# Patient Record
Sex: MALE | Race: WHITE | Employment: FULL TIME | URBAN - METROPOLITAN AREA
[De-identification: names, ages, dates, MRNs, and addresses within clinical notes are randomized per-mention and may not be internally consistent; named-entity substitution may affect disease eponyms.]

---

## 2022-10-07 ENCOUNTER — OFFICE VISIT (OUTPATIENT)
Dept: CARDIOLOGY CLINIC | Age: 62
End: 2022-10-07
Payer: COMMERCIAL

## 2022-10-07 ENCOUNTER — TELEPHONE (OUTPATIENT)
Dept: CARDIOLOGY CLINIC | Age: 62
End: 2022-10-07

## 2022-10-07 VITALS
DIASTOLIC BLOOD PRESSURE: 86 MMHG | HEART RATE: 72 BPM | BODY MASS INDEX: 29.8 KG/M2 | OXYGEN SATURATION: 95 % | SYSTOLIC BLOOD PRESSURE: 138 MMHG | HEIGHT: 72 IN | WEIGHT: 220 LBS

## 2022-10-07 DIAGNOSIS — G47.33 OSA (OBSTRUCTIVE SLEEP APNEA): ICD-10-CM

## 2022-10-07 DIAGNOSIS — Z72.0 TOBACCO ABUSE: ICD-10-CM

## 2022-10-07 DIAGNOSIS — R00.2 PALPITATIONS: ICD-10-CM

## 2022-10-07 DIAGNOSIS — Z71.6 TOBACCO ABUSE COUNSELING: ICD-10-CM

## 2022-10-07 DIAGNOSIS — R07.9 CHEST PAIN, UNSPECIFIED TYPE: Primary | ICD-10-CM

## 2022-10-07 DIAGNOSIS — E78.2 MIXED HYPERLIPIDEMIA: ICD-10-CM

## 2022-10-07 DIAGNOSIS — F10.10 ETOH ABUSE: ICD-10-CM

## 2022-10-07 PROCEDURE — 99204 OFFICE O/P NEW MOD 45 MIN: CPT | Performed by: STUDENT IN AN ORGANIZED HEALTH CARE EDUCATION/TRAINING PROGRAM

## 2022-10-07 PROCEDURE — 93000 ELECTROCARDIOGRAM COMPLETE: CPT | Performed by: STUDENT IN AN ORGANIZED HEALTH CARE EDUCATION/TRAINING PROGRAM

## 2022-10-07 RX ORDER — BUPROPION HYDROCHLORIDE 100 MG/1
100 TABLET ORAL DAILY
COMMUNITY

## 2022-10-07 RX ORDER — ASPIRIN 81 MG/1
81 TABLET ORAL DAILY
COMMUNITY

## 2022-10-07 RX ORDER — ATORVASTATIN CALCIUM 20 MG/1
20 TABLET, FILM COATED ORAL DAILY
COMMUNITY
Start: 2022-10-06

## 2022-10-07 RX ORDER — METOPROLOL TARTRATE 25 MG/1
25 TABLET, FILM COATED ORAL 2 TIMES DAILY
Qty: 60 TABLET | Refills: 3 | Status: SHIPPED | OUTPATIENT
Start: 2022-10-07

## 2022-10-07 RX ORDER — EZETIMIBE 10 MG/1
10 TABLET ORAL DAILY
COMMUNITY
Start: 2022-10-06

## 2022-10-07 RX ORDER — TESTOSTERONE 20.25 MG/1.25G
GEL TOPICAL
COMMUNITY
Start: 2022-09-16

## 2022-10-07 NOTE — PROGRESS NOTES
Beverley Soares is a 62 y.o. male    Chief Complaint   Patient presents with    New Patient     Self referred        Chest pain patient states some flutters and when working     SOB no    Dizziness at times     Swelling no    Refills no    Visit Vitals  /86 (BP 1 Location: Left upper arm, BP Patient Position: Sitting)   Pulse 72   Ht 6' (1.829 m)   Wt 220 lb (99.8 kg)   SpO2 95%   BMI 29.84 kg/m²       1. Have you been to the ER, urgent care clinic since your last visit? Hospitalized since your last visit? No    2. Have you seen or consulted any other health care providers outside of the [address] [place] [address] since your last visit? Include any pap smears or colon screening.  No

## 2022-10-14 NOTE — PROGRESS NOTES
[facility] [address], [address] [facility], [address] [place] [address]    Office [phone],WBD [phone]           Ricki Yanez is a 62 y.o. male presents to the office for evaluation of heart flutters      Assessment/Recommendations:      ICD-10-CM ICD-9-CM    1. Chest pain, unspecified type  R07.9 786.50 AMB POC EKG ROUTINE W/ 12 LEADS, INTER & REP      ECHO ADULT COMPLETE      NUCLEAR CARDIAC STRESS TEST      2. Palpitations  R00.2 785.1 CARDIAC EVENT MONITOR      3. LACY (obstructive sleep apnea)  G47.33 327.23       4. ETOH abuse  F10.10 305.00       5. Mixed hyperlipidemia  E78.2 272.2       6. Tobacco abuse  Z72.0 305.1       7. Tobacco abuse counseling  Z71.6 V65.42      305.1           Palpitations. Suspect patient is having paroxysmal episodes of PSVT probable atrial fibrillation. Recommend to proceed with event monitor. Begin therapy with metoprolol 25 mg twice daily    Chest pain symptoms-recommend to proceed with echo along with exercise stress test.  Beta-blocker therapy per above    Hyperlipidemia-remains on atorvastatin 20 mg daily along with Zetia    Alcohol abuse    Tobacco abuse    Probable LACY      Primary Care Physician- Shoshana Uriostegui MD    Follow-up 4-6 weeks        Subjective:  62 y.o. presents to the office to establish care. Patient has a history of hyperlipidemia and thyroid disease. He reports recently having a fluttering sensation in his chest.  Does notice some occasional chest tightness with a fluttering sensation. Does not regularly exercises. Enjoys woodworking in his spare time. 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 She notices that when he drinks alcohol he has significantly increased snoring.   Multiple first cousins have had myocardial infarctions which prompted him to seek cardiovascular care with a fluttering sensation in his chest.      Past Medical History:   Diagnosis Date    Arthritis     Thyroid disease         Past Surgical History:   Procedure Laterality Date    HX ORTHOPAEDIC  1990's    repair left ankle fracture with plates and screws    HX OTHER SURGICAL      wisdom teeth         Current Outpatient Medications:     atorvastatin (LIPITOR) 20 mg tablet, 20 mg daily. , Disp: , Rfl:     ezetimibe (ZETIA) 10 mg tablet, 10 mg daily. , Disp: , Rfl:     aspirin delayed-release 81 mg tablet, Take 81 mg by mouth daily. , Disp: , Rfl:     testosterone (ANDROGEL) 20.25 mg/1.25 gram (1.62 %) gel, APPLY 3 PUMPS TO SKIN IN THE MORNING TO SHOULDER, UPPER ARMS OR ABDOMEN ONCE DAILY, Disp: , Rfl:     buPROPion (WELLBUTRIN) 100 mg tablet, Take 100 mg by mouth daily. Unsure of exact dosage, Disp: , Rfl:     metoprolol tartrate (LOPRESSOR) 25 mg tablet, Take 1 Tablet by mouth two (2) times a day., Disp: 60 Tablet, Rfl: 3    levothyroxine 200 mcg cap, Take 200 mcg by mouth Daily (before breakfast). , Disp: , Rfl:     Allergies   Allergen Reactions    Augmentin [Amoxicillin-Pot Clavulanate] Nausea and Vomiting        Family History   Problem Relation Age of Onset    Lung Disease Mother     Osteoporosis Mother     OSTEOARTHRITIS Mother     Cancer Father         throat    Alcohol abuse Brother     Diabetes Brother        Social History     Tobacco Use    Smoking status: Every Day     Packs/day: 0.75     Years: 15.00     Pack years: 11.25     Types: Cigarettes    Smokeless tobacco: Never   Substance Use Topics    Alcohol use: Yes     Alcohol/week: 21.0 standard drinks     Types: 21 Cans of beer per week     Comment: 3-4 daily    Drug use: No       Review of Symptoms:  Pertinent Positive: Palpitations, chest pain    Pertinent Negative: No exertional dyspnea, orthopnea, PND  All Other systems reviewed and are negative for a Comprehensive ROS (10+)    Physical Exam    Blood pressure 138/86, pulse 72, height 6' (1.829 m), weight 220 lb (99.8 kg), SpO2 95 %.   Constitutional: well-developed and well-nourished. No distress. HENT: Normocephalic. Eyes: No scleral icterus. Neck:  Neck supple. No JVD present. Pulmonary/Chest: Effort normal and breath sounds normal. No respiratory distress, wheezes or rales. Cardiovascular: Normal rate, regular rhythm, S1 S2 . Exam reveals no gallop and no friction rub. No murmur heard. No edema. Extremities:  Normal muscle tone  Abdominal:   No abnormal distension. Neurological:  Moving all extremities, cranial nerves appear grossly intact. Skin: Skin is not cold. Not diaphoretic. No erythema. Psychiatric:  Grossly normal mood and affect. Intact insight. Objective Data: Investigations personally reviewed and interpreted    ECG: Sinus rhythm, normal electrocardiogram          Investigations reviewed             Roopa Ballard DO          ATTENTION:   This medical record was transcribed using an electronic medical records/speech recognition system. Although proofread, it may and can contain electronic, spelling and other errors. Corrections may be executed at a later time. Please feel free to contact us for any clarifications as needed.

## 2022-10-28 ENCOUNTER — ANCILLARY PROCEDURE (OUTPATIENT)
Dept: CARDIOLOGY CLINIC | Age: 62
End: 2022-10-28
Payer: COMMERCIAL

## 2022-10-28 ENCOUNTER — ANCILLARY PROCEDURE (OUTPATIENT)
Dept: CARDIOLOGY CLINIC | Age: 62
End: 2022-10-28

## 2022-10-28 VITALS — HEIGHT: 72 IN | WEIGHT: 220 LBS | BODY MASS INDEX: 29.8 KG/M2

## 2022-10-28 VITALS
WEIGHT: 220 LBS | BODY MASS INDEX: 29.8 KG/M2 | DIASTOLIC BLOOD PRESSURE: 78 MMHG | SYSTOLIC BLOOD PRESSURE: 122 MMHG | HEIGHT: 72 IN

## 2022-10-28 DIAGNOSIS — R07.9 CHEST PAIN, UNSPECIFIED TYPE: ICD-10-CM

## 2022-10-28 LAB
ECHO AO ASC DIAM: 3.1 CM
ECHO AO ASCENDING AORTA INDEX: 1.4 CM/M2
ECHO AO ROOT DIAM: 3.5 CM
ECHO AO ROOT INDEX: 1.58 CM/M2
ECHO AV AREA PEAK VELOCITY: 3 CM2
ECHO AV AREA VTI: 3.1 CM2
ECHO AV AREA/BSA PEAK VELOCITY: 1.4 CM2/M2
ECHO AV AREA/BSA VTI: 1.4 CM2/M2
ECHO AV MEAN GRADIENT: 4 MMHG
ECHO AV MEAN VELOCITY: 1 M/S
ECHO AV PEAK GRADIENT: 8 MMHG
ECHO AV PEAK VELOCITY: 1.4 M/S
ECHO AV VELOCITY RATIO: 0.71
ECHO AV VTI: 28.2 CM
ECHO LA DIAMETER INDEX: 1.76 CM/M2
ECHO LA DIAMETER: 3.9 CM
ECHO LA TO AORTIC ROOT RATIO: 1.11
ECHO LA VOL 2C: 49 ML (ref 18–58)
ECHO LA VOL 4C: 62 ML (ref 18–58)
ECHO LA VOL BP: 55 ML (ref 18–58)
ECHO LA VOL/BSA BIPLANE: 25 ML/M2 (ref 16–34)
ECHO LA VOLUME AREA LENGTH: 58 ML
ECHO LA VOLUME INDEX A2C: 22 ML/M2 (ref 16–34)
ECHO LA VOLUME INDEX A4C: 28 ML/M2 (ref 16–34)
ECHO LA VOLUME INDEX AREA LENGTH: 26 ML/M2 (ref 16–34)
ECHO LV E' LATERAL VELOCITY: 7 CM/S
ECHO LV E' SEPTAL VELOCITY: 7 CM/S
ECHO LV EDV A2C: 105 ML
ECHO LV EDV A4C: 107 ML
ECHO LV EDV BP: 112 ML (ref 67–155)
ECHO LV EDV INDEX A4C: 48 ML/M2
ECHO LV EDV INDEX BP: 50 ML/M2
ECHO LV EDV NDEX A2C: 47 ML/M2
ECHO LV EJECTION FRACTION A2C: 61 %
ECHO LV EJECTION FRACTION A4C: 61 %
ECHO LV EJECTION FRACTION BIPLANE: 62 % (ref 55–100)
ECHO LV ESV A2C: 40 ML
ECHO LV ESV A4C: 42 ML
ECHO LV ESV BP: 43 ML (ref 22–58)
ECHO LV ESV INDEX A2C: 18 ML/M2
ECHO LV ESV INDEX A4C: 19 ML/M2
ECHO LV ESV INDEX BP: 19 ML/M2
ECHO LV FRACTIONAL SHORTENING: 32 % (ref 28–44)
ECHO LV INTERNAL DIMENSION DIASTOLE INDEX: 2.12 CM/M2
ECHO LV INTERNAL DIMENSION DIASTOLIC: 4.7 CM (ref 4.2–5.9)
ECHO LV INTERNAL DIMENSION SYSTOLIC INDEX: 1.44 CM/M2
ECHO LV INTERNAL DIMENSION SYSTOLIC: 3.2 CM
ECHO LV IVSD: 1 CM (ref 0.6–1)
ECHO LV MASS 2D: 175.8 G (ref 88–224)
ECHO LV MASS INDEX 2D: 79.2 G/M2 (ref 49–115)
ECHO LV POSTERIOR WALL DIASTOLIC: 1.1 CM (ref 0.6–1)
ECHO LV RELATIVE WALL THICKNESS RATIO: 0.47
ECHO LVOT AREA: 4.2 CM2
ECHO LVOT AV VTI INDEX: 0.72
ECHO LVOT DIAM: 2.3 CM
ECHO LVOT MEAN GRADIENT: 2 MMHG
ECHO LVOT PEAK GRADIENT: 4 MMHG
ECHO LVOT PEAK VELOCITY: 1 M/S
ECHO LVOT STROKE VOLUME INDEX: 38.2 ML/M2
ECHO LVOT SV: 84.7 ML
ECHO LVOT VTI: 20.4 CM
ECHO MV A VELOCITY: 0.73 M/S
ECHO MV AREA PHT: 3.4 CM2
ECHO MV E DECELERATION TIME (DT): 223.4 MS
ECHO MV E VELOCITY: 0.78 M/S
ECHO MV E/A RATIO: 1.07
ECHO MV E/E' LATERAL: 11.14
ECHO MV E/E' RATIO (AVERAGED): 11.14
ECHO MV E/E' SEPTAL: 11.14
ECHO MV PRESSURE HALF TIME (PHT): 64.8 MS
ECHO RV TAPSE: 2 CM (ref 1.7–?)
NUC STRESS EJECTION FRACTION: 61 %
STRESS ANGINA INDEX: 0
STRESS BASELINE DIAS BP: 78 MMHG
STRESS BASELINE HR: 58 BPM
STRESS BASELINE ST DEPRESSION: 0 MM
STRESS BASELINE SYS BP: 122 MMHG
STRESS ESTIMATED WORKLOAD: 10.8 METS
STRESS EXERCISE DUR MIN: 9 MIN
STRESS EXERCISE DUR SEC: 0 SEC
STRESS O2 SAT PEAK: 99 %
STRESS O2 SAT REST: 100 %
STRESS PEAK DIAS BP: 82 MMHG
STRESS PEAK SYS BP: 210 MMHG
STRESS PERCENT HR ACHIEVED: 90 %
STRESS POST PEAK HR: 144 BPM
STRESS RATE PRESSURE PRODUCT: NORMAL BPM*MMHG
STRESS TARGET HR: 160 BPM

## 2022-10-28 PROCEDURE — 93015 CV STRESS TEST SUPVJ I&R: CPT | Performed by: STUDENT IN AN ORGANIZED HEALTH CARE EDUCATION/TRAINING PROGRAM

## 2022-10-28 PROCEDURE — 93306 TTE W/DOPPLER COMPLETE: CPT | Performed by: STUDENT IN AN ORGANIZED HEALTH CARE EDUCATION/TRAINING PROGRAM

## 2022-10-28 PROCEDURE — A9500 TC99M SESTAMIBI: HCPCS | Performed by: STUDENT IN AN ORGANIZED HEALTH CARE EDUCATION/TRAINING PROGRAM

## 2022-10-28 PROCEDURE — 78452 HT MUSCLE IMAGE SPECT MULT: CPT | Performed by: STUDENT IN AN ORGANIZED HEALTH CARE EDUCATION/TRAINING PROGRAM

## 2022-10-28 RX ORDER — TETRAKIS(2-METHOXYISOBUTYLISOCYANIDE)COPPER(I) TETRAFLUOROBORATE 1 MG/ML
10 INJECTION, POWDER, LYOPHILIZED, FOR SOLUTION INTRAVENOUS ONCE
Status: COMPLETED | OUTPATIENT
Start: 2022-10-28 | End: 2022-10-28

## 2022-10-28 RX ORDER — TETRAKIS(2-METHOXYISOBUTYLISOCYANIDE)COPPER(I) TETRAFLUOROBORATE 1 MG/ML
30 INJECTION, POWDER, LYOPHILIZED, FOR SOLUTION INTRAVENOUS ONCE
Status: COMPLETED | OUTPATIENT
Start: 2022-10-28 | End: 2022-10-28

## 2022-10-28 RX ADMIN — TETRAKIS(2-METHOXYISOBUTYLISOCYANIDE)COPPER(I) TETRAFLUOROBORATE 8.7 MILLICURIE: 1 INJECTION, POWDER, LYOPHILIZED, FOR SOLUTION INTRAVENOUS at 10:00

## 2022-10-28 RX ADMIN — TETRAKIS(2-METHOXYISOBUTYLISOCYANIDE)COPPER(I) TETRAFLUOROBORATE 26.2 MILLICURIE: 1 INJECTION, POWDER, LYOPHILIZED, FOR SOLUTION INTRAVENOUS at 10:50

## 2022-10-28 NOTE — PROGRESS NOTES
Echo showed normal heart function    Stress test showed very minor abnormality at the very tip of the heart. We can discuss findings at upcoming appt.  No concerning findings

## 2022-11-07 ENCOUNTER — TELEPHONE (OUTPATIENT)
Dept: CARDIOLOGY CLINIC | Age: 62
End: 2022-11-07

## 2022-11-07 NOTE — TELEPHONE ENCOUNTER
Tedm requesting a call back to reschedule upcoming appointment with Dr. Fabrice Higgins on 11/21. Please reschedule to next available.

## 2022-11-09 NOTE — TELEPHONE ENCOUNTER
Patient is concerned about the medication metoprolol tartrate 25 mg that the doctor put him on. If he doesn't need to continue taking this he want's to be taken off. Please advise.     [phone]

## 2022-11-11 NOTE — TELEPHONE ENCOUNTER
Returned patient's call he would like to know if he needs to continue the Metoprolol 25 mg BID. Patient wore the 14 day Event monitor & has completed the testing as ordered.     Please advise

## 2022-11-18 NOTE — TELEPHONE ENCOUNTER
Called patient advised per Dr. Chad Juarez he can hold Metoprolol. We will discuss results at if/up visit which is 1/11/23. Patient will call if he needs to be seen sooner than his scheduled appt.